# Patient Record
(demographics unavailable — no encounter records)

---

## 2025-01-06 NOTE — PHYSICAL EXAM
[Normal] : no acute distress, well nourished, well developed and well-appearing [de-identified] : Neck: Mild discomfort with forward flexion.  Otherwise full nontender range of motion.  No tenderness.  Negative Spurling. Shoulders: Full range of motion bilateral shoulders.  Discomfort with abduction of the left shoulder.  Negative empty can test, negative liftoff, negative Chavez.  No tenderness. No muscle soreness in the upper arms, forearms, thighs or calves. [de-identified] : 5/5 strength both arms at the shoulders, elbows, wrists and fingers.  5/5 strength bilateral proximal distal lower extremities.

## 2025-01-06 NOTE — HISTORY OF PRESENT ILLNESS
[FreeTextEntry8] : CC: muscle soreness.  Reports muscle soreness on the L side. Started in her L arm spread to her L leg and then her R leg. Started about one week ago. Worse with activity i.e. lifting her son. No ameliorating factors. It has slowly been getting better.  No joint swelling, no rash., no fevers.  Today she has only L shoulder pain.

## 2025-07-17 NOTE — REVIEW OF SYSTEMS
[Feeling Poorly] : feeling poorly [Pelvic Pain] : pelvic pain [Anxiety] : anxiety [Negative] : Heme/Lymph [FreeTextEntry1] : breast pain

## 2025-07-17 NOTE — HISTORY OF PRESENT ILLNESS
[FreeTextEntry1] : 36-year-old premenopausal woman of [place] descent who has a family history of breast cancer comes in for examination and surveillance as a high risk patient.  Patient denies any breast masses or nipple discharge and last March she had a bilateral mammogram which showed extremely dense breast tissue, BI-RADS 1.  More recently patient's been having some occasional breast pain and underwent an ultrasound which was likewise unremarkable, BI-RADS 1.  Patient's mother had breast cancer at age 50 and gene tested negative.  There is no family history of cancers on the father side of the family.  Patient has never had a breast biopsy and is never taken hormones.  Patient's Tyrer-Cuzick shows a 27.9% lifetime risk of breast cancer in the Renee model shows a 0.6% 5-year and 18.0% lifetime risk of breast cancer.  Patient does not currently smoke and occasionally drinks 4-6 times a week.

## 2025-07-17 NOTE — REASON FOR VISIT
[Initial Evaluation] : an initial evaluation [FreeTextEntry1] : High risk breast cancer due to family history and breast pain